# Patient Record
Sex: MALE | Race: BLACK OR AFRICAN AMERICAN
[De-identification: names, ages, dates, MRNs, and addresses within clinical notes are randomized per-mention and may not be internally consistent; named-entity substitution may affect disease eponyms.]

---

## 2019-04-18 ENCOUNTER — HOSPITAL ENCOUNTER (EMERGENCY)
Dept: HOSPITAL 72 - EMR | Age: 69
Discharge: HOME | End: 2019-04-18
Payer: MEDICARE

## 2019-04-18 VITALS — DIASTOLIC BLOOD PRESSURE: 88 MMHG | SYSTOLIC BLOOD PRESSURE: 177 MMHG

## 2019-04-18 VITALS — SYSTOLIC BLOOD PRESSURE: 207 MMHG | DIASTOLIC BLOOD PRESSURE: 105 MMHG

## 2019-04-18 VITALS — SYSTOLIC BLOOD PRESSURE: 168 MMHG | DIASTOLIC BLOOD PRESSURE: 52 MMHG

## 2019-04-18 VITALS — HEIGHT: 70 IN | WEIGHT: 167 LBS | BODY MASS INDEX: 23.91 KG/M2

## 2019-04-18 DIAGNOSIS — Z88.8: ICD-10-CM

## 2019-04-18 DIAGNOSIS — S16.1XXA: Primary | ICD-10-CM
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
## 2019-04-18 DIAGNOSIS — R51: ICD-10-CM

## 2019-04-18 DIAGNOSIS — Z99.2: ICD-10-CM

## 2019-04-18 DIAGNOSIS — I12.0: ICD-10-CM

## 2019-04-18 DIAGNOSIS — N18.6: ICD-10-CM
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
## 2019-04-18 PROCEDURE — 70450 CT HEAD/BRAIN W/O DYE: CPT

## 2019-04-18 PROCEDURE — 72125 CT NECK SPINE W/O DYE: CPT

## 2019-04-18 PROCEDURE — 99284 EMERGENCY DEPT VISIT MOD MDM: CPT

## 2019-04-18 PROCEDURE — 93005 ELECTROCARDIOGRAM TRACING: CPT

## 2019-04-18 NOTE — DIAGNOSTIC IMAGING REPORT
Indication: Pain status post injury

 

Technique: CT cervical spine was performed utilizing automated exposure control

without intravenous contrast material. Axial, sagittal coronal images were generated.

 

CT dose (combined CT head cervical spine): Total DLP 1823.59 mGycm; CTDI vol

70.38,15.65 mGy

 

Comparison: None

 

Findings:

No acute cervical spine fracture is identified. Cervical lordosis is maintained.

There is no evidence of traumatic malalignment. There are multilevel discogenic

degenerative changes of the cervical spine manifested by multilevel disc space

narrowing, endplate sclerosis/cystic change, multilevel osteophytes as well as

multilevel facet and uncovertebral joint hypertrophy. Overall findings are

mild-to-moderate. Degenerative changes result in varying degrees of central canal

stenosis which is overall mild as fluid attenuation is noted both anterior and

posterior to the cord throughout the cervical spine. Bony foraminal narrowing appears

moderate to severe on the left at C4-C5 and bilaterally at C5-C6. 

 

No abnormality is noted at the craniocervical junction. Thyroid is mildly

heterogeneous without discrete nodule imaged lung apices are clear. There are

atherosclerotic vascular calcifications. A dialysis catheter is partially visualized.

 

IMPRESSION: 

 

Degenerative changes of the cervical spine without evidence of acute fracture.

 

 

 

The CT scanner at [facility] is accredited by the American College of

Radiology and the scans are performed using protocols designed to limit radiation

exposure to as low as reasonably achievable to attain images of sufficient resolution

adequate for diagnostic evaluation.
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
## 2019-04-18 NOTE — NUR
ED Nurse Note:



Pt cleared by health care Provider for discharge.  DC instructions/prescription 
was given and explained to pt and verbalized understanding of teachings. All 
medical deviecs such as ID band  removed. Pt is PRABHA caballero4, ambulatory and left 
with all personal belongings.

-------------------------------------------------------------------------------

Addendum: 04/18/19 at 1824 by [email or web address]

-------------------------------------------------------------------------------

ED Nurse Note:



Pt cleared by health care Provider for discharge.  DC instructions/prescription 
was given and explained to pt and verbalized understanding of teachings. Pt 
refused to take off the ID band. Two nurses informed that all medical device 
including ID band have to removed prior to discharge. Pt refused. Pt is PRABHA lazcano, 
aleft with all personal belongings in his wheelchair.
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
## 2019-04-18 NOTE — DIAGNOSTIC IMAGING REPORT
Indication: Pain status post injury

 

Technique: Continuous helical CT scanning of the head was performed utilizing

automated exposure control without intravenous contrast material. Axial and coronal

reconstructions were obtained.

 

Comparison: None

 

CT dose (combined CT head and cervical spine): Total DLP 1823.59 mGycm; CTDI vol

70.38,15.65 mGy

 

Findings: There is no acute intracranial hemorrhage, mass effect or cortical edema.

The ventricles, cisterns and sulci are prominent consistent with atrophy.

Periventricular hypoattenuation is seen, a nonspecific finding. There are

atherosclerotic vascular calcifications. Visualized mastoid air cells and paranasal

sinuses are unremarkable. No focal lesions of the bony calvarium or soft tissues of

the scalp are seen.

 

IMPRESSION: 

 

No evidence of acute intracranial hemorrhage, mass effect or cortical edema. 

 

No acute skull fracture.

 

Mild atrophy and nonspecific periventricular hypoattenuation suggestive of chronic

ischemic microvascular changes.

 

The CT scanner at [facility] is accredited by the American College of

Radiology and the scans are performed using protocols designed to limit radiation

exposure to as low as reasonably achievable to attain images of sufficient resolution

adequate for diagnostic evaluation.

## 2019-04-19 NOTE — CARDIOLOGY REPORT
--------------- APPROVED REPORT --------------





EKG Measurement

Heart [id number]

KS [id number]

[id number]

[id number]

[id number]





Sinus rhythm with marked sinus arrhythmia

Left axis deviation

Voltage criteria for left ventricular hypertrophy

Cannot rule out Septal infarct, age undetermined

T wave abnormality, consider lateral ischemia

Abnormal ECG